# Patient Record
Sex: MALE | Race: WHITE | NOT HISPANIC OR LATINO | Employment: UNEMPLOYED | URBAN - METROPOLITAN AREA
[De-identification: names, ages, dates, MRNs, and addresses within clinical notes are randomized per-mention and may not be internally consistent; named-entity substitution may affect disease eponyms.]

---

## 2023-12-26 ENCOUNTER — OFFICE VISIT (OUTPATIENT)
Dept: FAMILY MEDICINE | Facility: CLINIC | Age: 23
End: 2023-12-26
Payer: COMMERCIAL

## 2023-12-26 VITALS
OXYGEN SATURATION: 99 % | WEIGHT: 315 LBS | SYSTOLIC BLOOD PRESSURE: 136 MMHG | HEIGHT: 76 IN | BODY MASS INDEX: 38.36 KG/M2 | HEART RATE: 78 BPM | RESPIRATION RATE: 20 BRPM | TEMPERATURE: 99.1 F | DIASTOLIC BLOOD PRESSURE: 78 MMHG

## 2023-12-26 DIAGNOSIS — Z00.00 ENCOUNTER FOR ANNUAL PHYSICAL EXAM: Primary | ICD-10-CM

## 2023-12-26 DIAGNOSIS — E66.01 MORBID OBESITY (H): ICD-10-CM

## 2023-12-26 LAB
ALBUMIN SERPL BCG-MCNC: 4.5 G/DL (ref 3.5–5.2)
ALP SERPL-CCNC: 76 U/L (ref 40–150)
ALT SERPL W P-5'-P-CCNC: 71 U/L (ref 0–70)
ANION GAP SERPL CALCULATED.3IONS-SCNC: 10 MMOL/L (ref 7–15)
AST SERPL W P-5'-P-CCNC: 39 U/L (ref 0–45)
BASOPHILS # BLD AUTO: 0 10E3/UL (ref 0–0.2)
BASOPHILS NFR BLD AUTO: 1 %
BILIRUB DIRECT SERPL-MCNC: <0.2 MG/DL (ref 0–0.3)
BILIRUB SERPL-MCNC: 0.5 MG/DL
BUN SERPL-MCNC: 11.3 MG/DL (ref 6–20)
CALCIUM SERPL-MCNC: 9.3 MG/DL (ref 8.6–10)
CHLORIDE SERPL-SCNC: 102 MMOL/L (ref 98–107)
CHOLEST SERPL-MCNC: 170 MG/DL
CREAT SERPL-MCNC: 0.73 MG/DL (ref 0.67–1.17)
DEPRECATED HCO3 PLAS-SCNC: 27 MMOL/L (ref 22–29)
EGFRCR SERPLBLD CKD-EPI 2021: >90 ML/MIN/1.73M2
EOSINOPHIL # BLD AUTO: 0.3 10E3/UL (ref 0–0.7)
EOSINOPHIL NFR BLD AUTO: 4 %
ERYTHROCYTE [DISTWIDTH] IN BLOOD BY AUTOMATED COUNT: 13.5 % (ref 10–15)
FASTING STATUS PATIENT QL REPORTED: YES
GLUCOSE SERPL-MCNC: 87 MG/DL (ref 70–99)
HBA1C MFR BLD: 5.2 % (ref 0–5.6)
HCT VFR BLD AUTO: 49.1 % (ref 40–53)
HDLC SERPL-MCNC: 27 MG/DL
HGB BLD-MCNC: 16.1 G/DL (ref 13.3–17.7)
IMM GRANULOCYTES # BLD: 0 10E3/UL
IMM GRANULOCYTES NFR BLD: 0 %
LDLC SERPL CALC-MCNC: 105 MG/DL
LYMPHOCYTES # BLD AUTO: 2.3 10E3/UL (ref 0.8–5.3)
LYMPHOCYTES NFR BLD AUTO: 27 %
MCH RBC QN AUTO: 26.7 PG (ref 26.5–33)
MCHC RBC AUTO-ENTMCNC: 32.8 G/DL (ref 31.5–36.5)
MCV RBC AUTO: 82 FL (ref 78–100)
MONOCYTES # BLD AUTO: 0.6 10E3/UL (ref 0–1.3)
MONOCYTES NFR BLD AUTO: 7 %
NEUTROPHILS # BLD AUTO: 5.3 10E3/UL (ref 1.6–8.3)
NEUTROPHILS NFR BLD AUTO: 62 %
NONHDLC SERPL-MCNC: 143 MG/DL
PLATELET # BLD AUTO: 396 10E3/UL (ref 150–450)
POTASSIUM SERPL-SCNC: 4.6 MMOL/L (ref 3.4–5.3)
PROT SERPL-MCNC: 7.5 G/DL (ref 6.4–8.3)
RBC # BLD AUTO: 6.02 10E6/UL (ref 4.4–5.9)
SODIUM SERPL-SCNC: 139 MMOL/L (ref 135–145)
TRIGL SERPL-MCNC: 192 MG/DL
TSH SERPL DL<=0.005 MIU/L-ACNC: 0.91 UIU/ML (ref 0.3–4.2)
WBC # BLD AUTO: 8.6 10E3/UL (ref 4–11)

## 2023-12-26 PROCEDURE — 80053 COMPREHEN METABOLIC PANEL: CPT | Performed by: FAMILY MEDICINE

## 2023-12-26 PROCEDURE — 99385 PREV VISIT NEW AGE 18-39: CPT | Performed by: FAMILY MEDICINE

## 2023-12-26 PROCEDURE — 82248 BILIRUBIN DIRECT: CPT | Performed by: FAMILY MEDICINE

## 2023-12-26 PROCEDURE — 80061 LIPID PANEL: CPT | Performed by: FAMILY MEDICINE

## 2023-12-26 PROCEDURE — 83036 HEMOGLOBIN GLYCOSYLATED A1C: CPT | Performed by: FAMILY MEDICINE

## 2023-12-26 PROCEDURE — 84443 ASSAY THYROID STIM HORMONE: CPT | Performed by: FAMILY MEDICINE

## 2023-12-26 PROCEDURE — 36415 COLL VENOUS BLD VENIPUNCTURE: CPT | Performed by: FAMILY MEDICINE

## 2023-12-26 PROCEDURE — 85025 COMPLETE CBC W/AUTO DIFF WBC: CPT | Performed by: FAMILY MEDICINE

## 2023-12-26 PROCEDURE — 99213 OFFICE O/P EST LOW 20 MIN: CPT | Mod: 25 | Performed by: FAMILY MEDICINE

## 2023-12-26 RX ORDER — AMOXICILLIN 500 MG/1
TABLET, FILM COATED ORAL
COMMUNITY
Start: 2023-12-18

## 2023-12-26 ASSESSMENT — ENCOUNTER SYMPTOMS
FEVER: 0
SORE THROAT: 0
MYALGIAS: 0
HEMATOCHEZIA: 0
WEAKNESS: 0
EYE PAIN: 0
NERVOUS/ANXIOUS: 0
CHILLS: 0
DYSURIA: 0
COUGH: 0
FREQUENCY: 0
DIZZINESS: 0
HEMATURIA: 0
PALPITATIONS: 0
NAUSEA: 0
ARTHRALGIAS: 0
DIARRHEA: 0
HEADACHES: 0
CONSTIPATION: 0
ABDOMINAL PAIN: 0
JOINT SWELLING: 0
SHORTNESS OF BREATH: 0
PARESTHESIAS: 0
HEARTBURN: 0

## 2023-12-26 NOTE — PROGRESS NOTES
"SUBJECTIVE:   He is a 23 year old, presenting for the following:  Physical        12/26/2023     3:28 PM   Additional Questions   Roomed by Kadie LAIRD       Healthy Habits:     Getting at least 3 servings of Calcium per day:  Yes    Bi-annual eye exam:  NO    Dental care twice a year:  Yes    Sleep apnea or symptoms of sleep apnea:  None    Diet:  Regular (no restrictions) and Low fat/cholesterol    Frequency of exercise:  2-3 days/week    Duration of exercise:  30-45 minutes    Taking medications regularly:  Yes    Medication side effects:  Not applicable    Additional concerns today:  No      Elevated blood pressure without diagnosis of hypertension: Blood pressure elevated 142/81 on initial evaluation.  Rechecked by provider, blood pressure is 136/78. Patient is asymptomatic at this time. Reviewed BP goals and recommendations. Patient will monitor.     Of note, BMI is 44.3 today.  Reviewed diagnosis and potential long-term consequences and general recommendations.  Offered further support if needed.     2/20/2015  1:51 PM 3/18/2015  8:30 PM 11/11/2015  11:47 AM   Vital Signs      Systolic 122  137  123    Diastolic 65  79  65    Pulse 81  73  85    Temperature 97.9  F (36.6  C)  99.3  F (37.4  C)  97.6  F (36.4  C)    Respirations  20     Weight (LB) 292 lb 1.6 oz  291 lb  294 lb    Height 6' 2.75\"   6' 2.75\"    BMI (Calculated) 36.83   37.07       12/26/2023  3:30 PM 12/26/2023  3:47 PM   Vital Signs     Systolic 148 !  142 !    Diastolic 93 (H)  81 !    Pulse 78     Temperature 99.1  F (37.3  C)     Respirations 20     Weight (LB) 361 lb (H)     Height 6' 3.669\"     BMI (Calculated) 44.33            Today's PHQ-2 Score:       12/26/2023     3:24 PM   PHQ-2 ( 1999 [employer])   Q1: Little interest or pleasure in doing things 0   Q2: Feeling down, depressed or hopeless 0   PHQ-2 Score 0   Q1: Little interest or pleasure in doing things Not at all   Q2: Feeling down, depressed or hopeless Not at all   PHQ-2 Score 0 "       -Reviewed healthy eating and exercise.  -Skin cancer screening: No concerning skin changes expressed by patient.  -Smoking status:   Tobacco Use      Smoking status: Never        Passive exposure: Never      Smokeless tobacco: Never      Tobacco comments: Some smoke exposure to grandparents    -Family history:   Family History   Problem Relation Age of Onset    Hypertension Maternal Grandmother     Hypertension Maternal Grandfather     Cerebrovascular Disease Maternal Grandfather     Heart Disease Maternal Grandfather         CHF    Eye Disorder Maternal Grandfather         glocoma    Diabetes Paternal Grandmother     Cancer - colorectal Paternal Grandmother     Diabetes Paternal Grandfather     Allergies Brother        Preventative health recommendations, evaluation options, and risk/benefits of each were discussed with patient. Accepted recommendations were ordered. Otherwise, patient declined.  There are no preventive care reminders to display for this patient.    Patient is due for the following vaccinations: Influenza, COVID-19. Patient declines all vaccinations today.     -Immunizations due were reviewed.    Immunization History   Administered Date(s) Administered    DTAP (<7y) 1998, 1998, 1998, 12/30/1999, 03/26/2003    HEPA 08/16/2010, 07/16/2012    HIB (PRP-T) 1998, 1998, 1998, 12/30/1999    HepB 1998, 1998, 03/02/1999    Influenza (H1N1) 11/16/2009    Influenza (IIV3) PF 11/18/2002, 12/23/2002    MMR 05/11/1999, 03/26/2003    Meningococcal ACWY (Menactra ) 08/16/2010    Nasal Influenza Vaccine 2-49 (FluMist) 10/21/2013    OPV, trivalent, live 09/03/1999    Pneumococcal (PCV 7) 11/09/2000, 01/22/2001, 04/13/2001    Poliovirus, inactivated (IPV) 1998, 1998, 09/03/1999, 03/26/2003, 09/26/2003    TDAP Vaccine (Adacel) 08/16/2010    Varicella 05/11/1999, 03/26/2003       -Labs: Laboratory recommendations reviewed with patient.      Social History  "    Tobacco Use    Smoking status: Never     Passive exposure: Never    Smokeless tobacco: Never    Tobacco comments:     Some smoke exposure to grandparents   Substance Use Topics    Alcohol use: No             2023     3:24 PM   Alcohol Use   Prescreen: >3 drinks/day or >7 drinks/week? No       Last PSA: No results found for: \"PSA\"    Reviewed orders with patient. Reviewed health maintenance and updated orders accordingly -yes    Reviewed and updated as needed this visit by clinical staff   Tobacco  Allergies   Problems             Reviewed and updated as needed this visit by Provider      Problems            Past Medical History:   Diagnosis Date    Allergic rhinitis     Edema     Otitis media     Recurrent    Pneumonia     Recurrent    RAD (reactive airway disease)       Past Surgical History:   Procedure Laterality Date    ARTHROSCOPY KNEE  2012    Procedure: ARTHROSCOPY KNEE;  right knee arthroscopy, loose body removal,chondroplasty, carticel biopsy  ;  Surgeon: Caleb Springer MD;  [facility]    ARTHROSCOPY KNEE  2014    Procedure: ARTHROSCOPY KNEE;  Surgeon: Caleb Springer MD;  [facility]    ARTHROTOMY KNEE AUTOGENOUS CARTILAGE IMPLANT (GENZYME)  2014    Procedure: ARTHROTOMY KNEE AUTOGENOUS CARTILAGE IMPLANT (GENZYME);  Surgeon: Caleb Springer MD;  [facility]    KNEE SURGERY      ZZC NONSPECIFIC PROCEDURE       circumcision   No new surgeries.     Review of Systems   Constitutional:  Negative for chills and fever.   HENT:  Negative for congestion, ear pain, hearing loss and sore throat.    Eyes:  Negative for pain and visual disturbance.   Respiratory:  Negative for cough and shortness of breath.    Cardiovascular:  Negative for chest pain, palpitations and peripheral edema.   Gastrointestinal:  Negative for abdominal pain, constipation, diarrhea, heartburn, hematochezia and nausea.   Genitourinary:  Negative for dysuria, frequency, " "genital sores, hematuria, impotence, penile discharge and urgency.   Musculoskeletal:  Negative for arthralgias, joint swelling and myalgias.   Skin:  Negative for rash.   Neurological:  Negative for dizziness, weakness, headaches and paresthesias.   Psychiatric/Behavioral:  Negative for mood changes. The patient is not nervous/anxious.        OBJECTIVE:   /78 (BP Location: Left arm, Patient Position: Sitting, Cuff Size: Adult Large)   Pulse 78   Temp 99.1  F (37.3  C) (Oral)   Resp 20   Ht 1.922 m (6' 3.67\")   Wt (!) 163.7 kg (361 lb)   SpO2 99%   BMI 44.33 kg/m      Physical Exam  GENERAL: healthy, alert and no distress  EYES: Eyes grossly normal to inspection, PERRL and conjunctivae and sclerae normal  HENT: ear canals and TM's normal, nose and mouth without ulcers or lesions  NECK: no adenopathy, no asymmetry, masses, or scars and thyroid normal to palpation  RESP: lungs clear to auscultation - no rales, rhonchi or wheezes  CV: regular rate and rhythm, normal S1 S2, no S3 or S4, no murmur, click or rub, no peripheral edema and peripheral pulses strong  ABDOMEN: soft, nontender, no hepatosplenomegaly, no masses and bowel sounds normal  MS: no gross musculoskeletal defects noted, no edema  SKIN: no suspicious lesions or rashes  NEURO: Normal strength and tone, mentation intact and speech normal  PSYCH: mentation appears normal, affect normal/bright    Diagnostic Test Results:  Labs reviewed in Epic    ASSESSMENT/PLAN:       Patient Instructions:    -You are doing great.  -Continue to eat well.  Try to increase your servings of calcium as this can help your bones stay strong and healthy.  Follow a nutrition plan patient fruits and vegetables and low in fats and cholesterol.    -Be sure to eat 5-7 servings of fruits and vegetables each day.  -Find ways to stay active.  Try to get 150 minutes of moderate activity (where you are breathing faster and slightly sweating) each week.  -Try to maintain a body " mass index (BMI) of 18.5-25 as this is considered a healthier weight range.  -Brush your teeth twice daily.  See a dentist every 6-12 months.  -Be sure to use sunblock with SPF 15 or greater when going outside for extended periods of time.  Sunblock should be used even when it is a cloudy day.  Do intermittent skin checks for any concerning skin changes.  Wearing a wide brimmed hat and sunglasses can also be helpful to protect your skin from the sun.  -Monitor for any abnormal skin changes (such as new moles/spots, painful moles, changes in your old moles, wounds that will not heal, multiple colors noted in one lesion, lesions that are asymmetric or not circular, or anything that is concerning for you). If any of these are noted, please schedule an appointment to be seen.     -It is generally recommended for you to complete a health care directive or living will. These documents will be able to reflect your wishes and desire in the case that you are unable to express them yourself. Please let Dr. Blanco know if you would like some assistance with this process.    -The influenza vaccine is recommended for you. Please check with your insurance to see where it is cheapest to have it administered.     -Dr. Blanco recommends that you check your blood pressure a couple of times a week for the next month.  Record the date, time, blood pressure readings, and heart rate.  -When checking your blood pressure, find a location where the area is calm and quiet. Try to sit down for 3-5 minutes first. Using a blood pressure cuff that wraps around the upper arm is more accurate. Keep your wrist facing up and legs straight. (If using a wrist cuff, be sure to hold the cuff up to the level of the heart when checking your blood pressure) As the blood pressure machine is working, do not talk or do anything else.   -The goal is to have the blood pressure under 140/90 on a consistent basis.  Blood pressures above this range increases your risk  of developing heart attacks, strokes, kidney issues, and many other medical issues.  -Try to limit salt intake.  Following a low-salt diet (eating about 0043-6212 mg or less of salt each day) can be helpful to control the blood pressure.  -Losing weight and getting your BMI in the normal range can also be helpful to better control the blood pressure.  -Find ways to stay active.   -Please see the end of the packet for more information regarding elevated blood pressures and things you can do at home to help improve the blood pressure.  -If blood blood pressure remains above goal, please schedule an appointment to follow-up with Dr. Blanco.    -Continue to work on losing weight.  Please see the attached documents for more information.  -Try to get 150 minutes of moderate physical activity per week.  -If you would like further support for weight loss, please reach out to Dr. Blanco.  There is a clinic dedicated to helping people lose weight.    Please seek immediate medical attention (go to the emergency room or urgent care) for the following reasons: worsening symptoms, or any concerning changes.      He was seen today for physical.  Diagnoses and all orders for this visit:    Encounter for annual physical exam  Morbid obesity (H): check labs as below. Patient will monitor BP  as discussed. If elevated, patient will return to clinic to discuss further.   -     Hemoglobin A1c; Future  -     Basic metabolic panel; Future  -     CBC with Platelets & Differential; Future  -     Hepatic function panel; Future  -     Lipid panel reflex to direct LDL Fasting; Future  -     TSH with free T4 reflex; Future      Patient has been advised of split billing requirements and indicates understanding: Yes (by nurse)      COUNSELING:   Preventive health recommendations reviewed.    He reports that he has never smoked. He has never been exposed to tobacco smoke. He has never used smokeless tobacco.      MD Monet Anaya  Clinic [facility] [place] [address]  Phone: [phone]  Fax: [phone]    12/28/2023  6:18 AM

## 2023-12-26 NOTE — PATIENT INSTRUCTIONS
-Thank you for choosing the [facility].  -It was a pleasure to see you today.  -Please take a look at the information below for more specific details regarding the treatment plan and recommendations.  -In this after visit summary is a list of your medications and specific instructions.  Please review this carefully as there may be changes made to your medication list.  -If there are any particular questions or concerns, please feel free to reach out to Dr. Blanco.  -If any labs have been completed, we will reach out to you about results.  If the results are normal or not concerning, a letter or MyChart message will be sent to you.  If any follow-up is needed, either Dr. Blanco or the nurse will give you a call.  If you have not heard regarding results after 2 weeks, please reach out to the clinic.    Patient Instructions:    -You are doing great.  -Continue to eat well.  Try to increase your servings of calcium as this can help your bones stay strong and healthy.  Follow a nutrition plan patient fruits and vegetables and low in fats and cholesterol.    -Be sure to eat 5-7 servings of fruits and vegetables each day.  -Find ways to stay active.  Try to get 150 minutes of moderate activity (where you are breathing faster and slightly sweating) each week.  -Try to maintain a body mass index (BMI) of 18.5-25 as this is considered a healthier weight range.  -Brush your teeth twice daily.  See a dentist every 6-12 months.  -Be sure to use sunblock with SPF 15 or greater when going outside for extended periods of time.  Sunblock should be used even when it is a cloudy day.  Do intermittent skin checks for any concerning skin changes.  Wearing a wide brimmed hat and sunglasses can also be helpful to protect your skin from the sun.  -Monitor for any abnormal skin changes (such as new moles/spots, painful moles, changes in your old moles, wounds that will not heal, multiple colors noted in one lesion,  lesions that are asymmetric or not circular, or anything that is concerning for you). If any of these are noted, please schedule an appointment to be seen.     -It is generally recommended for you to complete a health care directive or living will. These documents will be able to reflect your wishes and desire in the case that you are unable to express them yourself. Please let Dr. Blanco know if you would like some assistance with this process.    -The influenza vaccine is recommended for you. Please check with your insurance to see where it is cheapest to have it administered.     -Dr. Blanco recommends that you check your blood pressure a couple of times a week for the next month.  Record the date, time, blood pressure readings, and heart rate.  -When checking your blood pressure, find a location where the area is calm and quiet. Try to sit down for 3-5 minutes first. Using a blood pressure cuff that wraps around the upper arm is more accurate. Keep your wrist facing up and legs straight. (If using a wrist cuff, be sure to hold the cuff up to the level of the heart when checking your blood pressure) As the blood pressure machine is working, do not talk or do anything else.   -The goal is to have the blood pressure under 140/90 on a consistent basis.  Blood pressures above this range increases your risk of developing heart attacks, strokes, kidney issues, and many other medical issues.  -Try to limit salt intake.  Following a low-salt diet (eating about 4773-6471 mg or less of salt each day) can be helpful to control the blood pressure.  -Losing weight and getting your BMI in the normal range can also be helpful to better control the blood pressure.  -Find ways to stay active.   -Please see the end of the packet for more information regarding elevated blood pressures and things you can do at home to help improve the blood pressure.  -If blood blood pressure remains above goal, please schedule an appointment to  follow-up with Dr. Blanco.    -Continue to work on losing weight.  Please see the attached documents for more information.  -Try to get 150 minutes of moderate physical activity per week.  -If you would like further support for weight loss, please reach out to Dr. Blanco.  There is a clinic dedicated to helping people lose weight.    Please seek immediate medical attention (go to the emergency room or urgent care) for the following reasons: worsening symptoms, or any concerning changes.      --------------------------------------------------------------------------------------------------------------------    -We are always looking for ways to improve.  You may be selected to receive a survey regarding your visit today.  We encourage you to complete the survey and provide specific, constructive feedback to help us improve our processes.  Thank you for your time!  -Please review the contact information listed on the after visit summary and in the electronic chart.  Below is the phone number that we have on file.  If there are any changes that are needed to be made, please reach out to the clinic.  [phone] (home)

## 2023-12-28 ENCOUNTER — TELEPHONE (OUTPATIENT)
Dept: FAMILY MEDICINE | Facility: CLINIC | Age: 23
End: 2023-12-28
Payer: COMMERCIAL

## 2023-12-28 NOTE — TELEPHONE ENCOUNTER
----- Message from Koby Blanco MD sent at 12/27/2023  5:14 PM CST -----  Team - please call patient with results.  Patient would also like results to be mailed to him.    Audrey Kimball,    I hope you have been well since our last visit. Below are the results from the testing completed at the visit.     The total cholesterol is normal and LDL or bad cholesterol is slightly high.  The triglycerides, which is primarily affected by food, is slightly elevated as well. The HDL or good cholesterol are in the low range.  No medications are recommended at this time, though you should try to follow a diet that is rich in fruits and vegetables and low in fats and cholesterol.  In addition, find ways to stay active.  Doing aerobic activities (such as running, biking, etc.) will help improve the HDL or good cholesterol.  Weight loss is also recommended.    The liver testing shows there is slight injury to the liver.  This is likely related to fatty deposits.  Lowering the cholesterol fluid intake and losing weight would be helpful in reducing the injury.  If not improved, over time, scarring on the liver can lead to liver failure.    The other labs are in the good range.    Dr. Blanco recommends that you continue on the plan as discussed in clinic.    If there are any questions or concerns, please call the clinic or schedule an appointment for follow up.     Best wishes,           Koby Blanco MD  [facility]  12/27/2023  5:12 PM

## 2024-01-04 NOTE — TELEPHONE ENCOUNTER
Called pt and relayed message to pt. Pt verbalized understanding.      Karson Rodriguez, BSN RN  [facility]